# Patient Record
Sex: FEMALE | Race: WHITE | NOT HISPANIC OR LATINO | URBAN - METROPOLITAN AREA
[De-identification: names, ages, dates, MRNs, and addresses within clinical notes are randomized per-mention and may not be internally consistent; named-entity substitution may affect disease eponyms.]

---

## 2017-02-13 ENCOUNTER — FOLLOW UP ESTABLISHED (OUTPATIENT)
Dept: URBAN - METROPOLITAN AREA CLINIC 44 | Facility: CLINIC | Age: 62
End: 2017-02-13
Payer: COMMERCIAL

## 2017-02-13 DIAGNOSIS — H04.123 DRY EYE SYNDROME OF BILATERAL LACRIMAL GLANDS: ICD-10-CM

## 2017-02-13 DIAGNOSIS — Z98.890 OTHER SPECIFIED POSTPROCEDURAL STATES: Primary | ICD-10-CM

## 2017-02-13 PROCEDURE — 76514 ECHO EXAM OF EYE THICKNESS: CPT | Performed by: OPTOMETRIST

## 2017-02-13 PROCEDURE — 92083 EXTENDED VISUAL FIELD XM: CPT | Performed by: OPTOMETRIST

## 2017-02-13 PROCEDURE — 92250 FUNDUS PHOTOGRAPHY W/I&R: CPT | Performed by: OPTOMETRIST

## 2017-02-13 PROCEDURE — 92014 COMPRE OPH EXAM EST PT 1/>: CPT | Performed by: OPTOMETRIST

## 2017-02-13 ASSESSMENT — INTRAOCULAR PRESSURE
OS: 17
OD: 17

## 2017-02-13 ASSESSMENT — KERATOMETRY
OS: 43.63
OD: 43.00

## 2017-03-14 ENCOUNTER — FOLLOW UP ESTABLISHED (OUTPATIENT)
Dept: URBAN - METROPOLITAN AREA CLINIC 44 | Facility: CLINIC | Age: 62
End: 2017-03-14
Payer: COMMERCIAL

## 2017-03-14 PROCEDURE — 99024 POSTOP FOLLOW-UP VISIT: CPT | Performed by: OPHTHALMOLOGY

## 2018-07-25 ENCOUNTER — FOLLOW UP ESTABLISHED (OUTPATIENT)
Dept: URBAN - METROPOLITAN AREA CLINIC 44 | Facility: CLINIC | Age: 63
End: 2018-07-25
Payer: COMMERCIAL

## 2018-07-25 DIAGNOSIS — H40.013 OPEN ANGLE WITH BORDERLINE FINDINGS, LOW RISK, BILATERAL: ICD-10-CM

## 2018-07-25 DIAGNOSIS — B02.29 OTHER POSTHERPETIC NERVOUS SYSTEM INVOLVEMENT: Primary | ICD-10-CM

## 2018-07-25 PROCEDURE — 92014 COMPRE OPH EXAM EST PT 1/>: CPT | Performed by: OPTOMETRIST

## 2018-07-25 ASSESSMENT — INTRAOCULAR PRESSURE
OS: 14
OD: 14

## 2018-08-29 ENCOUNTER — FOLLOW UP ESTABLISHED (OUTPATIENT)
Dept: URBAN - METROPOLITAN AREA CLINIC 44 | Facility: CLINIC | Age: 63
End: 2018-08-29
Payer: COMMERCIAL

## 2018-08-29 DIAGNOSIS — H40.023 OPEN ANGLE WITH BORDERLINE FINDINGS, HIGH RISK, BILATERAL: Primary | ICD-10-CM

## 2018-08-29 PROCEDURE — 92014 COMPRE OPH EXAM EST PT 1/>: CPT | Performed by: OPTOMETRIST

## 2018-08-29 PROCEDURE — 92250 FUNDUS PHOTOGRAPHY W/I&R: CPT | Performed by: OPTOMETRIST

## 2018-08-29 PROCEDURE — 92083 EXTENDED VISUAL FIELD XM: CPT | Performed by: OPTOMETRIST

## 2018-08-29 ASSESSMENT — INTRAOCULAR PRESSURE
OS: 14
OD: 15

## 2018-08-29 ASSESSMENT — KERATOMETRY
OS: 43.63
OD: 43.63

## 2018-08-29 ASSESSMENT — VISUAL ACUITY
OD: 20/20
OS: 20/25

## 2018-11-01 ENCOUNTER — FOLLOW UP ESTABLISHED (OUTPATIENT)
Dept: URBAN - METROPOLITAN AREA CLINIC 44 | Facility: CLINIC | Age: 63
End: 2018-11-01
Payer: COMMERCIAL

## 2018-11-01 PROCEDURE — 92133 CPTRZD OPH DX IMG PST SGM ON: CPT | Performed by: OPTOMETRIST

## 2018-11-01 PROCEDURE — 76514 ECHO EXAM OF EYE THICKNESS: CPT | Performed by: OPTOMETRIST

## 2018-11-01 PROCEDURE — 92014 COMPRE OPH EXAM EST PT 1/>: CPT | Performed by: OPTOMETRIST

## 2018-11-01 ASSESSMENT — INTRAOCULAR PRESSURE
OS: 16
OD: 20

## 2018-11-12 ENCOUNTER — FOLLOW UP ESTABLISHED (OUTPATIENT)
Dept: URBAN - METROPOLITAN AREA CLINIC 44 | Facility: CLINIC | Age: 63
End: 2018-11-12
Payer: COMMERCIAL

## 2018-11-12 DIAGNOSIS — H40.1131 PRIMARY OPEN-ANGLE GLAUCOMA, BILATERAL, MILD STAGE: Primary | ICD-10-CM

## 2018-11-12 PROCEDURE — 92014 COMPRE OPH EXAM EST PT 1/>: CPT | Performed by: OPHTHALMOLOGY

## 2018-11-12 ASSESSMENT — INTRAOCULAR PRESSURE
OD: 25
OS: 20

## 2019-01-28 ENCOUNTER — FOLLOW UP ESTABLISHED (OUTPATIENT)
Dept: URBAN - METROPOLITAN AREA CLINIC 44 | Facility: CLINIC | Age: 64
End: 2019-01-28
Payer: COMMERCIAL

## 2019-01-28 PROCEDURE — 92012 INTRM OPH EXAM EST PATIENT: CPT | Performed by: OPHTHALMOLOGY

## 2019-01-28 ASSESSMENT — INTRAOCULAR PRESSURE
OD: 22
OS: 18

## 2019-08-14 ENCOUNTER — FOLLOW UP ESTABLISHED (OUTPATIENT)
Dept: URBAN - METROPOLITAN AREA CLINIC 44 | Facility: CLINIC | Age: 64
End: 2019-08-14
Payer: COMMERCIAL

## 2019-08-14 DIAGNOSIS — Z96.1 PRESENCE OF INTRAOCULAR LENS: ICD-10-CM

## 2019-08-14 PROCEDURE — 92014 COMPRE OPH EXAM EST PT 1/>: CPT | Performed by: OPTOMETRIST

## 2019-08-14 PROCEDURE — 92133 CPTRZD OPH DX IMG PST SGM ON: CPT | Performed by: OPTOMETRIST

## 2019-08-14 ASSESSMENT — INTRAOCULAR PRESSURE
OD: 20
OS: 15
OS: 18
OD: 19

## 2020-01-08 ENCOUNTER — FOLLOW UP ESTABLISHED (OUTPATIENT)
Dept: URBAN - METROPOLITAN AREA CLINIC 44 | Facility: CLINIC | Age: 65
End: 2020-01-08
Payer: COMMERCIAL

## 2020-01-08 PROCEDURE — 92083 EXTENDED VISUAL FIELD XM: CPT | Performed by: OPTOMETRIST

## 2020-01-08 PROCEDURE — 92012 INTRM OPH EXAM EST PATIENT: CPT | Performed by: OPTOMETRIST

## 2020-01-08 ASSESSMENT — INTRAOCULAR PRESSURE
OS: 17
OD: 18

## 2020-05-18 ENCOUNTER — FOLLOW UP ESTABLISHED (OUTPATIENT)
Dept: URBAN - METROPOLITAN AREA CLINIC 44 | Facility: CLINIC | Age: 65
End: 2020-05-18
Payer: COMMERCIAL

## 2020-05-18 PROCEDURE — 92133 CPTRZD OPH DX IMG PST SGM ON: CPT | Performed by: OPHTHALMOLOGY

## 2020-05-18 PROCEDURE — 92012 INTRM OPH EXAM EST PATIENT: CPT | Performed by: OPHTHALMOLOGY

## 2020-05-18 ASSESSMENT — INTRAOCULAR PRESSURE
OS: 21
OD: 22

## 2020-07-13 ENCOUNTER — FOLLOW UP ESTABLISHED (OUTPATIENT)
Dept: URBAN - METROPOLITAN AREA CLINIC 43 | Facility: CLINIC | Age: 65
End: 2020-07-13
Payer: COMMERCIAL

## 2020-07-13 DIAGNOSIS — H16.223 KERATOCONJUNCTIVITIS SICCA, BILATERAL: ICD-10-CM

## 2020-07-13 DIAGNOSIS — E06.3 AUTOIMMUNE THYROIDITIS: ICD-10-CM

## 2020-07-13 PROCEDURE — 92014 COMPRE OPH EXAM EST PT 1/>: CPT | Performed by: OPTOMETRIST

## 2020-07-13 PROCEDURE — 92133 CPTRZD OPH DX IMG PST SGM ON: CPT | Performed by: OPTOMETRIST

## 2020-07-13 ASSESSMENT — VISUAL ACUITY
OD: 20/20
OS: 20/20

## 2020-07-13 ASSESSMENT — KERATOMETRY
OS: 43.25
OD: 43.50

## 2020-07-13 ASSESSMENT — INTRAOCULAR PRESSURE
OS: 19
OD: 19

## 2021-05-14 ENCOUNTER — OFFICE VISIT (OUTPATIENT)
Dept: URBAN - METROPOLITAN AREA CLINIC 44 | Facility: CLINIC | Age: 66
End: 2021-05-14
Payer: COMMERCIAL

## 2021-05-14 PROCEDURE — 99213 OFFICE O/P EST LOW 20 MIN: CPT | Performed by: OPHTHALMOLOGY

## 2021-05-14 RX ORDER — LATANOPROST 50 UG/ML
0.005 % SOLUTION OPHTHALMIC
Qty: 15 | Refills: 3 | Status: INACTIVE
Start: 2021-05-14 | End: 2021-06-17

## 2021-05-14 ASSESSMENT — INTRAOCULAR PRESSURE
OD: 24
OS: 24

## 2021-05-14 NOTE — IMPRESSION/PLAN
Impression: Primary open-angle glaucoma, mild stage, bilateral Plan: PT IS A GLAUCOMA SUSPECT OU     GONIO: 3 OU (11/12/18)     PACHS:  563 // 554 (11/12/18) PT DENIES SULFA ALLERGY
PT DENIES LUNG DZ
TARGET IOP MID TEENS OR LESS
RECOMMEND 1. RECOMMEND PT START LATANOPROST OU QPM, PT DEFERS 11/12/18-05/14/21( GIVEN RX TODAY 05/14/21 ) 2.  PT WISHES TO RETURN FOR F/U ANNUALLY, I RECOMMEND SHE RETURN SOONER IF SHE WISHES TO START TREATMENT

## 2021-08-27 ENCOUNTER — OFFICE VISIT (OUTPATIENT)
Dept: URBAN - METROPOLITAN AREA CLINIC 44 | Facility: CLINIC | Age: 66
End: 2021-08-27
Payer: COMMERCIAL

## 2021-08-27 PROCEDURE — 76514 ECHO EXAM OF EYE THICKNESS: CPT | Performed by: OPHTHALMOLOGY

## 2021-08-27 PROCEDURE — 92083 EXTENDED VISUAL FIELD XM: CPT | Performed by: OPHTHALMOLOGY

## 2021-08-27 PROCEDURE — 99214 OFFICE O/P EST MOD 30 MIN: CPT | Performed by: OPHTHALMOLOGY

## 2021-08-27 ASSESSMENT — INTRAOCULAR PRESSURE
OS: 20
OD: 20

## 2021-08-27 NOTE — IMPRESSION/PLAN
Impression: Primary open-angle glaucoma, mild stage, bilateral Plan: PT IS A GLAUCOMA SUSPECT OU     GONIO: 3 OU (11/12/18)     PACHS:  563 // 554 (11/12/18) PT DENIES SULFA ALLERGY
PT DENIES LUNG DZ
TARGET IOP MID TEENS OR LESS
RECOMMEND 1. PT WISHES TO STOP LATANOPROST OU QPM --STARTED 05/14/21 (STOP 8/27/21) 2. RECOMMEND CONSULT  [facility] FOR NEXT STEPS IN TREATMENT

## 2021-09-17 ENCOUNTER — OFFICE VISIT (OUTPATIENT)
Dept: URBAN - METROPOLITAN AREA CLINIC 51 | Facility: CLINIC | Age: 66
End: 2021-09-17
Payer: COMMERCIAL

## 2021-09-17 DIAGNOSIS — H02.88B MEIBOMIAN GLAND DYSFUNCTION LEFT EYE, UPPER AND LOWER EYELIDS: Primary | ICD-10-CM

## 2021-09-17 DIAGNOSIS — H02.88A MEIBOMIAN GLAND DYSFNCT RIGHT EYE, UPPER AND LOWER EYELIDS: ICD-10-CM

## 2021-09-17 PROCEDURE — BRUDE BRUDER EYE MOIST COMPRESS: CUSTOM | Performed by: OPTOMETRIST

## 2021-09-17 PROCEDURE — 99214 OFFICE O/P EST MOD 30 MIN: CPT | Performed by: OPTOMETRIST

## 2021-09-17 PROCEDURE — 0330T TEAR FILM IMG UNI/BI W/I&R: CPT | Performed by: OPTOMETRIST

## 2021-09-17 NOTE — IMPRESSION/PLAN
Impression: Meibomian gland dysfnct right eye, upper and lower eyelids: H02.88A.  Plan: see MGD plan

## 2021-09-17 NOTE — IMPRESSION/PLAN
Impression: Meibomian gland dysfunction left eye, upper and lower eyelids: H02.88B. *Hx of Graves disease with Hashimoto's (controlling with diet, no pills), blepharoptosis *Uses cPAP machine, (tried several models, couldn't tolerate nose pillow) *Uses ceiling fans on low Plan: Reviewed Ajit Khan with patient. Examination of the eyelids when closed shows malposition and incomplete lid closure due to Graves disease. Explained to the patient there is three important tear film layers and if there is a breakdown if any of the layers, dry eyes is the cause of their symptoms. Reviewed this condition has no cure and dry eye treatment is focused on managing symptoms. 1) Complete blinks stressed 2) DRY warm compresses (Cindy's mask or rice-in-sock method) BID ~ 10 minutes 3) Preservative-free artifical tears, such as Refresh [place] or Systane QID OU. Disp sample of Refresh [place]. Reviewed to keep eyes closed for about 20-30 seconds. 3) Minimize ceiling fan exposure or excessive wind/sun exposure 4) Eye shield/goggles such as ONYIX eye shield for protective barrier while using cPAP machine at bedtime. If no improvement in 2-3 weeks, we can consider punctal plugs (0.3 small) OS to help minimize drainage.

## 2021-10-08 ENCOUNTER — OFFICE VISIT (OUTPATIENT)
Dept: URBAN - METROPOLITAN AREA CLINIC 51 | Facility: CLINIC | Age: 66
End: 2021-10-08
Payer: COMMERCIAL

## 2021-10-08 PROCEDURE — 99214 OFFICE O/P EST MOD 30 MIN: CPT | Performed by: OPTOMETRIST

## 2021-10-08 NOTE — IMPRESSION/PLAN
Impression: Squamous blepharitis right eye, upper and lower eyelids: H01.02A. Plan: See Plan H01. 02B

## 2021-10-08 NOTE — IMPRESSION/PLAN
Impression: Squamous blepharitis left eye, upper and lower eyelids: H01.02B. Plan: Reviewed blepharitis that causes symptoms such as: itchy, sore and red eyelids that stick together, crusty or greasy eyelashes, burning, gritty sensation in your eyes, and visible dandruff on your eyelashes. Some causes are a build up of oil and debris on the eyelids and eyelashes, bacterial eyelid infections, MGD, complication of skin conditions such as Seborrhoeic Dermatitis or Rosacea. Blepharitis is a common inflammatory condition where the edges of the eyelids become red, swollen and inflamed, with some mattering. The most important part of treating and managing Blepharitis is to keep the eyelids clean. Pt review for the following treatment:
1) START using eye lid cleanser such as Optase Eyelid Cleansing Wipes BID OU. Demo'd to right lids in office today for application 2) Cont with DRY warm compresses BID OU PRIOR using lid cleanser. 3) Continue with Preservative-free ATs QID OU. 4) Suggest Vaseline or Aquaphor to use around upper and lower lid margins to help with irritation.

## 2021-10-21 ENCOUNTER — OFFICE VISIT (OUTPATIENT)
Dept: URBAN - METROPOLITAN AREA CLINIC 44 | Facility: CLINIC | Age: 66
End: 2021-10-21
Payer: COMMERCIAL

## 2021-10-21 DIAGNOSIS — H40.1132 PRIMARY OPEN-ANGLE GLAUCOMA, BILATERAL, MODERATE STAGE: Primary | ICD-10-CM

## 2021-10-21 DIAGNOSIS — H01.02A SQUAMOUS BLEPHARITIS RIGHT EYE, UPPER AND LOWER EYELIDS: ICD-10-CM

## 2021-10-21 DIAGNOSIS — H01.02B SQUAMOUS BLEPHARITIS LEFT EYE, UPPER AND LOWER EYELIDS: ICD-10-CM

## 2021-10-21 PROCEDURE — 92133 CPTRZD OPH DX IMG PST SGM ON: CPT | Performed by: OPHTHALMOLOGY

## 2021-10-21 PROCEDURE — 92020 GONIOSCOPY: CPT | Performed by: OPHTHALMOLOGY

## 2021-10-21 PROCEDURE — 76514 ECHO EXAM OF EYE THICKNESS: CPT | Performed by: OPHTHALMOLOGY

## 2021-10-21 PROCEDURE — 99213 OFFICE O/P EST LOW 20 MIN: CPT | Performed by: OPHTHALMOLOGY

## 2021-10-21 RX ORDER — KETOROLAC TROMETHAMINE 5 MG/ML
0.5 % SOLUTION OPHTHALMIC
Qty: 10 | Refills: 1 | Status: INACTIVE
Start: 2021-10-21 | End: 2022-01-31

## 2021-10-21 ASSESSMENT — INTRAOCULAR PRESSURE
OD: 24
OS: 22

## 2021-10-21 NOTE — IMPRESSION/PLAN
Impression: Primary open-angle glaucoma, bilateral, moderate stage: F29.0914. Plan: Patient has ptosis and lids are only being taped intermittently for VFs- recommend TAPE LIDS EVERY VF. But, given IOPs up, ON thinning, recommend lower IOP OU. Discussed the need for SLT vs additional medicine to try and achieve better pressure control. Patient does not wish to use eye drops again. Selective Laser Trabeculoplasty risks, benefits, alternatives to laser discussed with patient, including vision loss, inflammation, or elevation of IOP. Discussed it may take anywhere from 6wks to 3mos to see the full effect of the laser. Patient understands that SLT is not a replacement for current drop therapy. All questions answered. Patient understands and desires to proceed. Educational material provided. Erx'd post op drop of Ketorolac [id number]. See [facility] to schedule SLT OD then OS.  Will need appointment 6wk w Dr Carolynn Sykes for IOP/DFE

## 2021-10-21 NOTE — IMPRESSION/PLAN
Impression: Squamous blepharitis left eye, upper and lower eyelids: H01.02B.  Plan: continue care w/ Dr. Cee Cope

## 2021-12-16 ENCOUNTER — SURGERY (OUTPATIENT)
Dept: URBAN - METROPOLITAN AREA SURGERY 19 | Facility: SURGERY | Age: 66
End: 2021-12-16
Payer: COMMERCIAL

## 2021-12-16 PROCEDURE — 65855 TRABECULOPLASTY LASER SURG: CPT | Performed by: OPHTHALMOLOGY

## 2021-12-20 ENCOUNTER — SURGERY (OUTPATIENT)
Dept: URBAN - METROPOLITAN AREA SURGERY 19 | Facility: SURGERY | Age: 66
End: 2021-12-20
Payer: COMMERCIAL

## 2021-12-20 PROCEDURE — 65855 TRABECULOPLASTY LASER SURG: CPT | Performed by: OPHTHALMOLOGY

## 2022-01-31 ENCOUNTER — OFFICE VISIT (OUTPATIENT)
Dept: URBAN - METROPOLITAN AREA CLINIC 43 | Facility: CLINIC | Age: 67
End: 2022-01-31
Payer: COMMERCIAL

## 2022-01-31 PROCEDURE — 99213 OFFICE O/P EST LOW 20 MIN: CPT | Performed by: OPTOMETRIST

## 2022-01-31 ASSESSMENT — INTRAOCULAR PRESSURE
OS: 14
OD: 18
OD: 16
OS: 16

## 2022-01-31 NOTE — IMPRESSION/PLAN
Impression: Primary open-angle glaucoma, mild stage, bilateral
pt refuses gtts Plan: PT IS A GLAUCOMA SUSPECT OU     GONIO: 3 OU (11/12/18)     PACHS:  563 // 554 (11/12/18) PT DENIES SULFA ALLERGY
PT DENIES LUNG DZ
TARGET IOP MID TEENS OR LESS
s/p SLT OU 12/21, IOP today 18/14, improved 
return 3-4 months for IOP with Dr. Ashu Childs

## 2022-05-12 ENCOUNTER — OFFICE VISIT (OUTPATIENT)
Dept: URBAN - METROPOLITAN AREA CLINIC 44 | Facility: CLINIC | Age: 67
End: 2022-05-12
Payer: COMMERCIAL

## 2022-05-12 DIAGNOSIS — H40.1132 PRIMARY OPEN-ANGLE GLAUCOMA, BILATERAL, MODERATE STAGE: Primary | ICD-10-CM

## 2022-05-12 PROCEDURE — 99213 OFFICE O/P EST LOW 20 MIN: CPT | Performed by: OPHTHALMOLOGY

## 2022-05-12 ASSESSMENT — INTRAOCULAR PRESSURE
OS: 20
OD: 22

## 2022-05-12 NOTE — IMPRESSION/PLAN
Impression: Primary open-angle glaucoma, bilateral, moderate stage: U49.9557. Plan: IOP is mildly improved. Given patient's history with drops, recommend continuing without drops. Will continue to monitor condition, IOP, and testing. Discussed may need surgery if testing worsening. 

RTC in 5 months for VFT 24-2 SS(tape lids for test), DFE, RNFL/GCA

## 2022-11-10 ENCOUNTER — OFFICE VISIT (OUTPATIENT)
Dept: URBAN - METROPOLITAN AREA CLINIC 44 | Facility: CLINIC | Age: 67
End: 2022-11-10
Payer: MEDICARE

## 2022-11-10 DIAGNOSIS — H02.423 MYOGENIC PTOSIS OF BILATERAL EYELIDS: Primary | ICD-10-CM

## 2022-11-10 PROCEDURE — 92285 EXTERNAL OCULAR PHOTOGRAPHY: CPT | Performed by: OPHTHALMOLOGY

## 2022-11-10 PROCEDURE — 99213 OFFICE O/P EST LOW 20 MIN: CPT | Performed by: OPHTHALMOLOGY

## 2022-11-10 PROCEDURE — 92082 INTERMEDIATE VISUAL FIELD XM: CPT | Performed by: OPHTHALMOLOGY

## 2022-11-10 NOTE — IMPRESSION/PLAN
Impression: Myogenic ptosis of bilateral eyelids: H02.423. Plan: Visual fields & photos were performed and interpreted today and demonstrated restriction of superior and temporal visual fields. This reverted to normal demonstrating greater than 30% improvement in visual fields with mechanical elevation of the eyelid and brow. The patient's upper eyelids responded adequately to phenylepherine testing, indicating that a posterior approach (conjunctivo-muellerectomy) to ptosis correction will adequately lift the upper eyelids. Visual field shows restriction of superior and temporal visual fields in resting position. With eyelids/brows elevated/taped there is a significant (>30%) improvement in the superior and temporal visual fields. MMCR OU 8mm. LOYD slightly lower than RUL; explained that at least some asymmetry would be present following surgery.

## 2023-01-27 ENCOUNTER — OFFICE VISIT (OUTPATIENT)
Dept: URBAN - METROPOLITAN AREA CLINIC 51 | Facility: CLINIC | Age: 68
End: 2023-01-27
Payer: MEDICARE

## 2023-01-27 DIAGNOSIS — H04.123 DRY EYE SYNDROME OF BILATERAL LACRIMAL GLANDS: ICD-10-CM

## 2023-01-27 DIAGNOSIS — H40.1131 PRIMARY OPEN-ANGLE GLAUCOMA, BILATERAL, MILD STAGE: Primary | ICD-10-CM

## 2023-01-27 DIAGNOSIS — H43.813 VITREOUS DEGENERATION, BILATERAL: ICD-10-CM

## 2023-01-27 DIAGNOSIS — H02.423 MYOGENIC PTOSIS OF BILATERAL EYELIDS: ICD-10-CM

## 2023-01-27 PROCEDURE — 92020 GONIOSCOPY: CPT | Performed by: STUDENT IN AN ORGANIZED HEALTH CARE EDUCATION/TRAINING PROGRAM

## 2023-01-27 PROCEDURE — 92133 CPTRZD OPH DX IMG PST SGM ON: CPT | Performed by: STUDENT IN AN ORGANIZED HEALTH CARE EDUCATION/TRAINING PROGRAM

## 2023-01-27 PROCEDURE — 92083 EXTENDED VISUAL FIELD XM: CPT | Performed by: STUDENT IN AN ORGANIZED HEALTH CARE EDUCATION/TRAINING PROGRAM

## 2023-01-27 PROCEDURE — 99214 OFFICE O/P EST MOD 30 MIN: CPT | Performed by: STUDENT IN AN ORGANIZED HEALTH CARE EDUCATION/TRAINING PROGRAM

## 2023-01-27 ASSESSMENT — INTRAOCULAR PRESSURE
OD: 17
OS: 17

## 2023-01-27 NOTE — IMPRESSION/PLAN
Impression: Vitreous degeneration, bilateral: H43.813. Plan: Patient to call/come in with RD symptoms.

## 2023-01-27 NOTE — IMPRESSION/PLAN
Impression: Myogenic ptosis of bilateral eyelids: H02.423.  Plan: followed by Dr. Army Bacon; lid surgery scheduled 02/13/23

## 2023-01-27 NOTE — IMPRESSION/PLAN
Impression: Primary open-angle glaucoma, bilateral, mild stage: H40.1131. Med Allergies: SULFA Plan: Ocular Surgical History: s/p IOL OU, YAG OU, SLT OU Pachy: 570/563 Tmax: 25/37 Target IOP: mid teens OU Heart / Lung / Kidney disease: Pt. denies Gonioscopy: grade 4, 2+ TMP OU
OCT: 68/67 early pole thinning ou HVF: OD: MD -2 sup arc/OS: MD -3 early sup arc
C/D: .7/.7 Better seeing eye: Equal
IOP Today: 17/17 Plan: IOP and testing stable OU. No new treatment being implemented at this time. Will continue to monitor IOP and testing. Discussed natural history of glaucoma and that irreversible vision loss can occur without adequate IOP control.

## 2023-01-27 NOTE — IMPRESSION/PLAN
Impression: Dry eye syndrome of bilateral lacrimal glands: H04.123. Plan: mildly dry. Patient to use PF AFT OU for dryness and irritation.

## 2023-05-01 ENCOUNTER — OFFICE VISIT (OUTPATIENT)
Dept: URBAN - METROPOLITAN AREA CLINIC 10 | Facility: CLINIC | Age: 68
End: 2023-05-01
Payer: MEDICARE

## 2023-05-01 DIAGNOSIS — H33.312 HORSESHOE TEAR OF RETINA WITHOUT DETACHMENT, LEFT EYE: ICD-10-CM

## 2023-05-01 DIAGNOSIS — H40.1131 PRIMARY OPEN-ANGLE GLAUCOMA, BILATERAL, MILD STAGE: ICD-10-CM

## 2023-05-01 DIAGNOSIS — H35.413 LATTICE DEGENERATION OF RETINA, BILATERAL: ICD-10-CM

## 2023-05-01 PROCEDURE — 99213 OFFICE O/P EST LOW 20 MIN: CPT | Performed by: OPTOMETRIST

## 2023-05-01 PROCEDURE — 92134 CPTRZ OPH DX IMG PST SGM RTA: CPT | Performed by: OPTOMETRIST

## 2023-05-01 ASSESSMENT — INTRAOCULAR PRESSURE
OS: 16
OD: 16

## 2023-05-01 NOTE — IMPRESSION/PLAN
Impression: Primary open-angle glaucoma, bilateral, mild stage: H40.1131. Med Allergies: SULFA Plan: Ocular Surgical History: s/p IOL OU, YAG OU, SLT OU Pachy: 570/563 Tmax: 25/37 Target IOP: mid teens OU Heart / Lung / Kidney disease: Pt. denies Gonioscopy: grade 4, 2+ TMP OU
OCT: 68/67 early pole thinning ou HVF: OD: MD -2 sup arc/OS: MD -3 early sup arc
C/D: .7/.7 Better seeing eye: Equal


Plan: IOP and testing stable OU. No new treatment being implemented at this time. Will continue to monitor IOP and testing. Cont. care c Dr. Carranza/Dr. Robinson Portillo. Discussed natural history of glaucoma and that irreversible vision loss can occur without adequate IOP control.

## 2023-05-01 NOTE — IMPRESSION/PLAN
Impression: Lattice degeneration of retina, bilateral: H35.413. Plan: See above. Pt. will see retina tomorrow.

## 2023-05-01 NOTE — IMPRESSION/PLAN
Impression: Retinal detachment with single break, left eye: H33.012. Plan: Superior RD. Mac on. Lattice associated. Pt. reports h/o blunt trauma 1 week prior (hit by the corner of a box). Symptoms x 1 day. Discussed PPV repair, positioning restriction expectations and NPO tomorrow AM. Pt. will see Dr. Ascencion Varela tomorrow AM and plan repair tomorrow PM.

## 2023-05-02 ENCOUNTER — OFFICE VISIT (OUTPATIENT)
Dept: URBAN - METROPOLITAN AREA CLINIC 10 | Facility: CLINIC | Age: 68
End: 2023-05-02
Payer: MEDICARE

## 2023-05-02 DIAGNOSIS — H33.012 RETINAL DETACHMENT WITH SINGLE BREAK, LEFT EYE: ICD-10-CM

## 2023-05-02 DIAGNOSIS — H43.813 VITREOUS DEGENERATION, BILATERAL: ICD-10-CM

## 2023-05-02 PROCEDURE — 92134 CPTRZ OPH DX IMG PST SGM RTA: CPT | Performed by: OPHTHALMOLOGY

## 2023-05-02 PROCEDURE — 99215 OFFICE O/P EST HI 40 MIN: CPT | Performed by: OPHTHALMOLOGY

## 2023-05-02 ASSESSMENT — INTRAOCULAR PRESSURE
OD: 14
OS: 15

## 2023-05-02 NOTE — IMPRESSION/PLAN
Impression: Primary open-angle glaucoma Plan: Ocular Surgical History: s/p IOL OU, YAG OU, SLT OU  -- IOP and Gen care [facility]

## 2023-05-02 NOTE — IMPRESSION/PLAN
Impression: RD Mac on OS H33.012. Plan: Superior RD. Mac on. Lattice associated. . .  I concur (pt reports h/o blunt trauma 1 week prior (hit by the corner of a box) - may or may not be related. Pt h/o Myopic ADDED on ER for [employer] . . . RETINA limb-threatening condition OS . Shayy Webster Detailed specialty exam of retina (incl scleral depressed exam) confirms retinal detachment (RD). Prompt Re-attachement surgery indicated and will be arranged. Even w successful surgery, risk of progressive or recur RD is est. 10-15%. URGED pt return immediately for repeat retinal eval if progressive/new symptoms (floaters, photopsias, field loss, etc). Vision will not recover to NML even w successful re-attachment. In some cases (1-5% est.)  retina NEVER re-attaches despite best efforts. Extensive education. All questions answered /understood. Proceed w RD repair surgx.    ER SURGERY 
    VIT / Amandeep Carry / Gas ER RD rpr OS w [employer] TODAY ER

## 2023-05-02 NOTE — IMPRESSION/PLAN
Impression: Lattice degeneration of retina, bilateral: H35.413.  Plan: superior/inferior lattice degeneration   -- FUTURE LASER OD w RB Block

## 2023-05-02 NOTE — IMPRESSION/PLAN
Impression: Vitreous degen Plan: Floaters will remove OS and observe OD . Porfirio Awkward Gordon Gowers

## 2023-05-03 ENCOUNTER — POST-OPERATIVE VISIT (OUTPATIENT)
Dept: URBAN - METROPOLITAN AREA CLINIC 43 | Facility: CLINIC | Age: 68
End: 2023-05-03

## 2023-05-03 DIAGNOSIS — Z48.810 ENCOUNTER FOR SURGICAL AFTERCARE FOLLOWING SURGERY ON A SENSE ORGAN: Primary | ICD-10-CM

## 2023-05-03 PROCEDURE — 99024 POSTOP FOLLOW-UP VISIT: CPT | Performed by: OPTOMETRIST

## 2023-05-03 ASSESSMENT — INTRAOCULAR PRESSURE: OS: 35

## 2023-05-03 NOTE — IMPRESSION/PLAN
Impression: S/P Posterior Vitrectomy; Laser photocoagulation; AFX (Air Fluid Gas Exchange) OS - 1 Day. Encounter for surgical aftercare following surgery on a sense organ  Z48.810. Plan: IOP is elevated.  start brimonidine TID OS, pt has sulfa allergy, return 2 days for post op IOP

## 2023-05-19 ENCOUNTER — OFFICE VISIT (OUTPATIENT)
Dept: URBAN - METROPOLITAN AREA CLINIC 10 | Facility: CLINIC | Age: 68
End: 2023-05-19
Payer: MEDICARE

## 2023-05-19 DIAGNOSIS — H33.012 RETINAL DETACHMENT WITH SINGLE BREAK, LEFT EYE: ICD-10-CM

## 2023-05-19 DIAGNOSIS — H35.413 LATTICE DEGENERATION OF RETINA, BILATERAL: Primary | ICD-10-CM

## 2023-05-19 DIAGNOSIS — H43.813 VITREOUS DEGENERATION, BILATERAL: ICD-10-CM

## 2023-05-19 PROCEDURE — 92134 CPTRZ OPH DX IMG PST SGM RTA: CPT | Performed by: OPHTHALMOLOGY

## 2023-05-19 PROCEDURE — 99024 POSTOP FOLLOW-UP VISIT: CPT | Performed by: OPHTHALMOLOGY

## 2023-05-19 ASSESSMENT — INTRAOCULAR PRESSURE
OS: 22
OD: 17

## 2023-05-19 NOTE — IMPRESSION/PLAN
Impression: Lattice degeneration of retina, bilateral: H35.413.  Plan: superior/inferior lattice degen    -- FUTURE LASER OD w RB Block in [place]

## 2023-05-19 NOTE — IMPRESSION/PLAN
Impression: RD Mac on OS H33.012 ER VIT/Lsr/Gas OS May '23 Nila Plan: hx;[[Superior RD. Mac on. Lattice assc'd. Mildred Hardy .I concur (pt reports h/o blunt trauma 1w prior (hit by corner of box) - may or may not be related. Pt h/o Myopic - ADDED on ER for URGENT RETINA Care . Mildred Hardy Blanca Gallop   ER VIT/Lsr/Gas OS May '23 Nila]] TODAY Attached / flat / Good repair / laser /gas resolving OS   
   TODAY postop   NO CME postop -- IMPROVED. Finish Gtts. RETINA f/u PRN --  RESTART IOP Gtts /  F/u Gen eye care  IOP check 1w. REMINDED pt:  Even w successful surgery, risk of progressive or recur RD is est. 10-15%. URGED pt return immediately for repeat retinal eval if progressive/new symptoms (floaters, photopsias, field loss, etc). Vision will not recover to NML even w successful re-attachment. In some cases (1-5% est.)  retina NEVER re-attaches despite best efforts. Extensive education. All questions answered /understood.

## 2023-05-24 ENCOUNTER — OFFICE VISIT (OUTPATIENT)
Dept: URBAN - METROPOLITAN AREA CLINIC 43 | Facility: CLINIC | Age: 68
End: 2023-05-24
Payer: MEDICARE

## 2023-05-24 DIAGNOSIS — H35.413 LATTICE DEGENERATION OF RETINA, BILATERAL: ICD-10-CM

## 2023-05-24 DIAGNOSIS — H43.811 VITREOUS DEGENERATION, RIGHT EYE: Primary | ICD-10-CM

## 2023-05-24 DIAGNOSIS — H33.012 RETINAL DETACHMENT WITH SINGLE BREAK, LEFT EYE: ICD-10-CM

## 2023-05-24 PROCEDURE — 99214 OFFICE O/P EST MOD 30 MIN: CPT | Performed by: OPTOMETRIST

## 2023-05-24 ASSESSMENT — INTRAOCULAR PRESSURE
OD: 15
OS: 17

## 2023-05-24 NOTE — IMPRESSION/PLAN
Impression: Lattice degeneration of retina, bilateral: H35.413. Plan: superior/inferior lattice degen  . Stable. Monitor.

## 2023-05-24 NOTE — IMPRESSION/PLAN
Impression: Retinal detachment with single break, left eye: H33.012.  Plan: s/p repair by Dr. Merlyn Titus, good laser barrier 360, no signs of new tears/detachments, monitor

## 2023-05-24 NOTE — IMPRESSION/PLAN
Impression: Vitreous degeneration, right eye: H43.811. Plan: Stable. Reassurance. Warning signs of retinal tear and detachment discussed with patient. To return to clinic STAT if any change in symptoms consistent with RT or RD.

## 2023-06-01 ENCOUNTER — POST-OPERATIVE VISIT (OUTPATIENT)
Dept: URBAN - METROPOLITAN AREA CLINIC 51 | Facility: CLINIC | Age: 68
End: 2023-06-01
Payer: MEDICARE

## 2023-06-01 DIAGNOSIS — Z48.810 ENCOUNTER FOR SURGICAL AFTERCARE FOLLOWING SURGERY ON A SENSE ORGAN: Primary | ICD-10-CM

## 2023-06-01 PROCEDURE — 99024 POSTOP FOLLOW-UP VISIT: CPT | Performed by: OPTOMETRIST

## 2023-06-01 ASSESSMENT — INTRAOCULAR PRESSURE
OD: 15
OS: 15

## 2023-06-01 NOTE — IMPRESSION/PLAN
Impression: S/P Posterior Vitrectomy; Laser photocoagulation; AFX (Air Fluid Gas Exchange) OS - 30 Days. Encounter for surgical aftercare following surgery on a sense organ  Z48.810. Plan: Reassured patient retina is intact. Warning signs of retinal tear and detachment discussed with patient. To return to clinic STAT if any change in symptoms consistent with RT or RD. Patient to trial off Brimonidine and will recheck IOP in 1 month with Dr. Regino Bravo.

## 2023-07-10 ENCOUNTER — OFFICE VISIT (OUTPATIENT)
Dept: URBAN - METROPOLITAN AREA CLINIC 43 | Facility: CLINIC | Age: 68
End: 2023-07-10
Payer: MEDICARE

## 2023-07-10 DIAGNOSIS — H40.1131 PRIMARY OPEN-ANGLE GLAUCOMA, BILATERAL, MILD STAGE: ICD-10-CM

## 2023-07-10 DIAGNOSIS — H33.012 RETINAL DETACHMENT WITH SINGLE BREAK, LEFT EYE: Primary | ICD-10-CM

## 2023-07-10 PROCEDURE — 99213 OFFICE O/P EST LOW 20 MIN: CPT | Performed by: OPTOMETRIST

## 2023-07-10 ASSESSMENT — INTRAOCULAR PRESSURE
OS: 17
OD: 20
OS: 24
OD: 27

## 2023-07-10 NOTE — IMPRESSION/PLAN
Impression: Primary open-angle glaucoma, bilateral, mild stage: H40.1131. Med Allergies: SULFA Plan: Ocular Surgical History: s/p IOL OU, YAG OU, SLT OU Pachy: 570/563 Tmax: 25/37 Target IOP: mid teens OU 
today IOP: 20/17 (pt d/c brimonidine that was ERx after retina surgery) Heart / Lung / Kidney disease: Pt. denies Gonioscopy: grade 4, 2+ TMP OU
OCT: 68/67 early pole thinning ou HVF: OD: MD -2 sup arc/OS: MD -3 early sup arc
C/D: .7/.7 cont without gtts.  return in 3 months for IOP check in PM)

## 2023-07-10 NOTE — IMPRESSION/PLAN
Impression: Retinal detachment with single break, left eye: H33.012.  Plan: s/p repair by Dr. Didi Lynn, good laser barrier 360, no signs of new tears/detachments, monitor

## 2023-10-16 ENCOUNTER — OFFICE VISIT (OUTPATIENT)
Dept: URBAN - METROPOLITAN AREA CLINIC 43 | Facility: CLINIC | Age: 68
End: 2023-10-16
Payer: MEDICARE

## 2023-10-16 DIAGNOSIS — H40.1131 PRIMARY OPEN-ANGLE GLAUCOMA, BILATERAL, MILD STAGE: Primary | ICD-10-CM

## 2023-10-16 PROCEDURE — 99213 OFFICE O/P EST LOW 20 MIN: CPT | Performed by: OPTOMETRIST

## 2023-10-16 ASSESSMENT — INTRAOCULAR PRESSURE
OS: 14
OD: 16
OS: 15
OD: 17

## 2023-11-13 ENCOUNTER — OFFICE VISIT (OUTPATIENT)
Dept: URBAN - METROPOLITAN AREA CLINIC 43 | Facility: CLINIC | Age: 68
End: 2023-11-13
Payer: MEDICARE

## 2023-11-13 DIAGNOSIS — H40.1131 PRIMARY OPEN-ANGLE GLAUCOMA, BILATERAL, MILD STAGE: Primary | ICD-10-CM

## 2023-11-13 DIAGNOSIS — H33.012 RETINAL DETACHMENT WITH SINGLE BREAK, LEFT EYE: ICD-10-CM

## 2023-11-13 DIAGNOSIS — H35.413 LATTICE DEGENERATION OF RETINA, BILATERAL: ICD-10-CM

## 2023-11-13 PROCEDURE — 92134 CPTRZ OPH DX IMG PST SGM RTA: CPT | Performed by: OPTOMETRIST

## 2023-11-13 PROCEDURE — 99214 OFFICE O/P EST MOD 30 MIN: CPT | Performed by: OPTOMETRIST

## 2023-11-13 ASSESSMENT — KERATOMETRY
OS: 43.50
OD: 43.25

## 2023-11-13 ASSESSMENT — VISUAL ACUITY
OS: 20/20
OD: 20/20

## 2023-11-13 ASSESSMENT — INTRAOCULAR PRESSURE
OS: 14
OD: 14

## 2024-01-26 ENCOUNTER — OFFICE VISIT (OUTPATIENT)
Dept: URBAN - METROPOLITAN AREA CLINIC 51 | Facility: CLINIC | Age: 69
End: 2024-01-26
Payer: MEDICARE

## 2024-01-26 DIAGNOSIS — H04.123 DRY EYE SYNDROME OF BILATERAL LACRIMAL GLANDS: ICD-10-CM

## 2024-01-26 PROCEDURE — 92133 CPTRZD OPH DX IMG PST SGM ON: CPT | Performed by: STUDENT IN AN ORGANIZED HEALTH CARE EDUCATION/TRAINING PROGRAM

## 2024-01-26 PROCEDURE — 92083 EXTENDED VISUAL FIELD XM: CPT | Performed by: STUDENT IN AN ORGANIZED HEALTH CARE EDUCATION/TRAINING PROGRAM

## 2024-01-26 PROCEDURE — 99214 OFFICE O/P EST MOD 30 MIN: CPT | Performed by: STUDENT IN AN ORGANIZED HEALTH CARE EDUCATION/TRAINING PROGRAM

## 2024-01-26 ASSESSMENT — INTRAOCULAR PRESSURE
OS: 15
OD: 15

## 2024-08-12 ENCOUNTER — OFFICE VISIT (OUTPATIENT)
Dept: URBAN - METROPOLITAN AREA CLINIC 43 | Facility: CLINIC | Age: 69
End: 2024-08-12
Payer: MEDICARE

## 2024-08-12 DIAGNOSIS — H40.1131 PRIMARY OPEN-ANGLE GLAUCOMA, BILATERAL, MILD STAGE: Primary | ICD-10-CM

## 2024-08-12 PROCEDURE — 99213 OFFICE O/P EST LOW 20 MIN: CPT | Performed by: OPTOMETRIST

## 2024-08-12 ASSESSMENT — INTRAOCULAR PRESSURE
OD: 20
OS: 14
OS: 19
OD: 16

## 2024-12-17 ENCOUNTER — OFFICE VISIT (OUTPATIENT)
Dept: URBAN - METROPOLITAN AREA CLINIC 43 | Facility: CLINIC | Age: 69
End: 2024-12-17
Payer: MEDICARE

## 2024-12-17 DIAGNOSIS — H33.012 RETINAL DETACHMENT WITH SINGLE BREAK, LEFT EYE: ICD-10-CM

## 2024-12-17 DIAGNOSIS — H04.123 DRY EYE SYNDROME OF BILATERAL LACRIMAL GLANDS: ICD-10-CM

## 2024-12-17 DIAGNOSIS — H40.1131 PRIMARY OPEN-ANGLE GLAUCOMA, BILATERAL, MILD STAGE: Primary | ICD-10-CM

## 2024-12-17 DIAGNOSIS — H43.811 VITREOUS DEGENERATION, RIGHT EYE: ICD-10-CM

## 2024-12-17 DIAGNOSIS — H52.4 PRESBYOPIA: ICD-10-CM

## 2024-12-17 DIAGNOSIS — H35.411 LATTICE DEGENERATION OF RETINA, RIGHT EYE: ICD-10-CM

## 2024-12-17 DIAGNOSIS — Z96.1 PRESENCE OF INTRAOCULAR LENS: ICD-10-CM

## 2024-12-17 PROCEDURE — 92133 CPTRZD OPH DX IMG PST SGM ON: CPT | Performed by: OPTOMETRIST

## 2024-12-17 PROCEDURE — 92083 EXTENDED VISUAL FIELD XM: CPT | Performed by: OPTOMETRIST

## 2024-12-17 PROCEDURE — 99214 OFFICE O/P EST MOD 30 MIN: CPT | Performed by: OPTOMETRIST

## 2024-12-17 ASSESSMENT — VISUAL ACUITY
OS: 20/20
OD: 20/20

## 2024-12-17 ASSESSMENT — INTRAOCULAR PRESSURE
OD: 16
OS: 14

## 2024-12-17 ASSESSMENT — KERATOMETRY
OD: 43.75
OS: 44.00

## 2025-02-17 ENCOUNTER — OFFICE VISIT (OUTPATIENT)
Dept: URBAN - METROPOLITAN AREA CLINIC 43 | Facility: CLINIC | Age: 70
End: 2025-02-17
Payer: MEDICARE

## 2025-02-17 DIAGNOSIS — H40.1131 PRIMARY OPEN-ANGLE GLAUCOMA, BILATERAL, MILD STAGE: Primary | ICD-10-CM

## 2025-02-17 PROCEDURE — 99213 OFFICE O/P EST LOW 20 MIN: CPT | Performed by: OPTOMETRIST

## 2025-02-17 PROCEDURE — 92083 EXTENDED VISUAL FIELD XM: CPT | Performed by: OPTOMETRIST

## 2025-02-17 ASSESSMENT — INTRAOCULAR PRESSURE
OD: 13
OS: 13

## 2025-07-22 ENCOUNTER — OFFICE VISIT (OUTPATIENT)
Dept: URBAN - METROPOLITAN AREA CLINIC 43 | Facility: CLINIC | Age: 70
End: 2025-07-22
Payer: MEDICARE

## 2025-07-22 DIAGNOSIS — H40.1131 PRIMARY OPEN-ANGLE GLAUCOMA, BILATERAL, MILD STAGE: Primary | ICD-10-CM

## 2025-07-22 PROCEDURE — 99213 OFFICE O/P EST LOW 20 MIN: CPT | Performed by: OPTOMETRIST

## 2025-07-22 ASSESSMENT — INTRAOCULAR PRESSURE
OD: 16
OD: 15
OS: 20
OS: 15